# Patient Record
Sex: FEMALE
[De-identification: names, ages, dates, MRNs, and addresses within clinical notes are randomized per-mention and may not be internally consistent; named-entity substitution may affect disease eponyms.]

---

## 2017-04-10 ENCOUNTER — MEDICATION RENEWAL (OUTPATIENT)
Age: 37
End: 2017-04-10

## 2017-06-09 ENCOUNTER — MEDICATION RENEWAL (OUTPATIENT)
Age: 37
End: 2017-06-09

## 2017-06-21 ENCOUNTER — APPOINTMENT (OUTPATIENT)
Dept: ENDOCRINOLOGY | Facility: CLINIC | Age: 37
End: 2017-06-21

## 2017-06-21 VITALS
HEART RATE: 65 BPM | DIASTOLIC BLOOD PRESSURE: 77 MMHG | SYSTOLIC BLOOD PRESSURE: 112 MMHG | BODY MASS INDEX: 21.74 KG/M2 | HEIGHT: 64.3 IN | WEIGHT: 127.31 LBS

## 2017-06-22 LAB — TSH SERPL-ACNC: 9.31 UIU/ML

## 2017-12-15 ENCOUNTER — RX RENEWAL (OUTPATIENT)
Age: 37
End: 2017-12-15

## 2018-05-15 ENCOUNTER — RX RENEWAL (OUTPATIENT)
Age: 38
End: 2018-05-15

## 2018-07-31 ENCOUNTER — MEDICATION RENEWAL (OUTPATIENT)
Age: 38
End: 2018-07-31

## 2018-08-08 ENCOUNTER — APPOINTMENT (OUTPATIENT)
Dept: ENDOCRINOLOGY | Facility: CLINIC | Age: 38
End: 2018-08-08
Payer: COMMERCIAL

## 2018-08-08 VITALS
BODY MASS INDEX: 22.36 KG/M2 | SYSTOLIC BLOOD PRESSURE: 98 MMHG | HEART RATE: 70 BPM | DIASTOLIC BLOOD PRESSURE: 65 MMHG | WEIGHT: 131 LBS | HEIGHT: 64.29 IN

## 2018-08-08 PROCEDURE — 99213 OFFICE O/P EST LOW 20 MIN: CPT

## 2018-10-19 ENCOUNTER — MEDICATION RENEWAL (OUTPATIENT)
Age: 38
End: 2018-10-19

## 2019-03-19 ENCOUNTER — MEDICATION RENEWAL (OUTPATIENT)
Age: 39
End: 2019-03-19

## 2019-08-26 ENCOUNTER — MEDICATION RENEWAL (OUTPATIENT)
Age: 39
End: 2019-08-26

## 2019-09-26 ENCOUNTER — APPOINTMENT (OUTPATIENT)
Dept: ENDOCRINOLOGY | Facility: CLINIC | Age: 39
End: 2019-09-26
Payer: COMMERCIAL

## 2019-09-26 VITALS
HEART RATE: 71 BPM | SYSTOLIC BLOOD PRESSURE: 112 MMHG | WEIGHT: 134 LBS | DIASTOLIC BLOOD PRESSURE: 71 MMHG | BODY MASS INDEX: 22.79 KG/M2

## 2019-09-26 DIAGNOSIS — Z23 ENCOUNTER FOR IMMUNIZATION: ICD-10-CM

## 2019-09-26 PROCEDURE — 99213 OFFICE O/P EST LOW 20 MIN: CPT | Mod: 25

## 2019-09-26 PROCEDURE — 90686 IIV4 VACC NO PRSV 0.5 ML IM: CPT

## 2019-09-26 PROCEDURE — G0008: CPT

## 2019-09-26 NOTE — HISTORY OF PRESENT ILLNESS
[FreeTextEntry1] : No health issues since last visit.\par feeling great.\par no palpitations or feeling jittery \par sleeping well.  BMs regular, no constipation\par working out on [employer] bike 5x/week before kids get up\par periods regular, every 24 days\par \par Taking Tirosint 112mcg/day.  extra pill 2x/month (has reminder on calendar)

## 2019-09-26 NOTE — PHYSICAL EXAM
[Healthy Appearance] : healthy appearance [Alert] : alert [Normal Voice/Communication] : normal voice communication [No Proptosis] : no proptosis [Normal Hearing] : hearing was normal [No Lid Lag] : no lid lag [Thyroid Not Enlarged] : the thyroid was not enlarged [No Thyroid Nodules] : there were no palpable thyroid nodules [Clear to Auscultation] : lungs were clear to auscultation bilaterally [Normal S1, S2] : normal S1 and S2 [Regular Rhythm] : with a regular rhythm [No Stigmata of Cushings Syndrome] : no stigmata of cushings syndrome [Acanthosis Nigricans] : no acanthosis nigricans [Normal Insight/Judgement] : insight and judgment were intact [Normal Affect] : the affect was normal [Normal Mood] : the mood was normal

## 2019-09-26 NOTE — ASSESSMENT
[FreeTextEntry1] : Goal TSH 0.5-4.0 range. Continue Tirosint. Previously had fluctuating levels of TSH with Synthroid and levothyroxine.\par flu vaccine given, R deltoid\par RTO 1 year

## 2019-09-30 LAB — TSH SERPL-ACNC: 1.5 UIU/ML

## 2019-11-15 ENCOUNTER — MEDICATION RENEWAL (OUTPATIENT)
Age: 39
End: 2019-11-15

## 2020-04-14 ENCOUNTER — TRANSCRIPTION ENCOUNTER (OUTPATIENT)
Age: 40
End: 2020-04-14

## 2020-05-20 ENCOUNTER — RX RENEWAL (OUTPATIENT)
Age: 40
End: 2020-05-20

## 2020-10-05 ENCOUNTER — RX RENEWAL (OUTPATIENT)
Age: 40
End: 2020-10-05

## 2020-11-06 ENCOUNTER — APPOINTMENT (OUTPATIENT)
Dept: ENDOCRINOLOGY | Facility: CLINIC | Age: 40
End: 2020-11-06
Payer: COMMERCIAL

## 2020-11-06 VITALS
WEIGHT: 138 LBS | SYSTOLIC BLOOD PRESSURE: 131 MMHG | HEART RATE: 61 BPM | BODY MASS INDEX: 23.47 KG/M2 | DIASTOLIC BLOOD PRESSURE: 86 MMHG

## 2020-11-06 DIAGNOSIS — Z00.00 ENCOUNTER FOR GENERAL ADULT MEDICAL EXAMINATION W/OUT ABNORMAL FINDINGS: ICD-10-CM

## 2020-11-06 PROCEDURE — 99072 ADDL SUPL MATRL&STAF TM PHE: CPT

## 2020-11-06 PROCEDURE — 99213 OFFICE O/P EST LOW 20 MIN: CPT | Mod: 25

## 2020-11-06 NOTE — HISTORY OF PRESENT ILLNESS
[FreeTextEntry1] : No health issues since last visit.\par ? 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  extra pill 2x/month (has reminder on calendar)

## 2020-11-06 NOTE — PHYSICAL EXAM
[Alert] : alert [Healthy Appearance] : healthy appearance [No Proptosis] : no proptosis [No Lid Lag] : no lid lag [Normal Hearing] : hearing was normal [No LAD] : no lymphadenopathy [Thyroid Not Enlarged] : the thyroid was not enlarged [Clear to Auscultation] : lungs were clear to auscultation bilaterally [Normal S1, S2] : normal S1 and S2 [Regular Rhythm] : with a regular rhythm [Acanthosis Nigricans] : no acanthosis nigricans [Normal Affect] : the affect was normal [Normal Mood] : the mood was normal

## 2020-11-06 NOTE — ASSESSMENT
[FreeTextEntry1] : Hashimoto's\par Goal TSH 0.5-4.0 range. Continue Tirosint--previously had fluctuating levels of TSH with Synthroid and levothyroxine.\par \par RTO 1 year

## 2020-11-09 LAB
ALBUMIN SERPL ELPH-MCNC: 4.6 G/DL
ALP BLD-CCNC: 63 U/L
ALT SERPL-CCNC: 8 U/L
ANION GAP SERPL CALC-SCNC: 13 MMOL/L
AST SERPL-CCNC: 21 U/L
BILIRUB SERPL-MCNC: 0.2 MG/DL
BUN SERPL-MCNC: 12 MG/DL
CALCIUM SERPL-MCNC: 9.7 MG/DL
CHLORIDE SERPL-SCNC: 99 MMOL/L
CHOLEST SERPL-MCNC: 226 MG/DL
CO2 SERPL-SCNC: 26 MMOL/L
CREAT SERPL-MCNC: 0.82 MG/DL
GLUCOSE SERPL-MCNC: 83 MG/DL
HDLC SERPL-MCNC: 74 MG/DL
LDLC SERPL CALC-MCNC: 128 MG/DL
NONHDLC SERPL-MCNC: 152 MG/DL
POTASSIUM SERPL-SCNC: 4.3 MMOL/L
PROT SERPL-MCNC: 7.1 G/DL
SODIUM SERPL-SCNC: 138 MMOL/L
TRIGL SERPL-MCNC: 118 MG/DL
TSH SERPL-ACNC: 2.07 UIU/ML

## 2020-11-30 ENCOUNTER — RX RENEWAL (OUTPATIENT)
Age: 40
End: 2020-11-30

## 2021-06-28 ENCOUNTER — RX RENEWAL (OUTPATIENT)
Age: 41
End: 2021-06-28

## 2021-08-23 ENCOUNTER — RX RENEWAL (OUTPATIENT)
Age: 41
End: 2021-08-23

## 2021-10-20 ENCOUNTER — RX RENEWAL (OUTPATIENT)
Age: 41
End: 2021-10-20

## 2021-11-08 ENCOUNTER — APPOINTMENT (OUTPATIENT)
Dept: ENDOCRINOLOGY | Facility: CLINIC | Age: 41
End: 2021-11-08
Payer: COMMERCIAL

## 2021-11-08 VITALS
BODY MASS INDEX: 22.79 KG/M2 | SYSTOLIC BLOOD PRESSURE: 143 MMHG | DIASTOLIC BLOOD PRESSURE: 104 MMHG | HEART RATE: 69 BPM | WEIGHT: 134 LBS

## 2021-11-08 PROCEDURE — 99213 OFFICE O/P EST LOW 20 MIN: CPT

## 2021-11-08 NOTE — ASSESSMENT
[FreeTextEntry1] : Hashimoto's\par Goal TSH 0.5-4.0 range. Continue Tirosint--previously had fluctuating levels of TSH with Synthroid and levothyroxine.\par \par HIgh BP. \par Advised to reduce salt in diet, including breads, soups, frozen and canned foods, deli meats. \par continue regular exercise.  Try to get more sleep (goal 7h/night).\par Recommended getting BP monitor to check at home ([employer]ron brand recommended).  If BP improves, then I would be ok with starting her on low estrogen content birth control pill to reduce menstrual bleeding and for birth control. \par RTO 1 year

## 2021-11-08 NOTE — HISTORY OF PRESENT ILLNESS
[FreeTextEntry1] : hasn't been feeling good the past 3-4 weeks.  Work has been very busy, stressful, not getting enough sleep (< 6h/night and choppy).  Feels like she has the flu when she gets up (achy, tired).  no nausea though appetite is down.\par weight is 4 lb less than last year, but same as the year prior\par no palpitations or feeling jittery \par still exercising a lot, works with weights 3x/week and uses [employer] bike\par BP is high today and was high in August at her PCP visit.\par period regular, every 24 days.   very heavy.   not planning pregnancy.  Interested in birth control. \par got flu vaccine\par \par Taking Tirosint 112mcg/day.  extra pill 2x/month (has reminder on calendar).   PA recently obtained for next year

## 2021-11-08 NOTE — DATA REVIEWED
[FreeTextEntry1] : 11/20: TSH 2.07, tot chol 226, trig 118, HDL 74, \par 7/18: TSH 2.640\par 6/17: TSH 9.31\par 8/16: TSH 3.43

## 2021-11-08 NOTE — PHYSICAL EXAM
[Alert] : alert [Healthy Appearance] : healthy appearance [No Proptosis] : no proptosis [No Lid Lag] : no lid lag [Normal Hearing] : hearing was normal [No LAD] : no lymphadenopathy [Thyroid Not Enlarged] : the thyroid was not enlarged [Clear to Auscultation] : lungs were clear to auscultation bilaterally [Normal S1, S2] : normal S1 and S2 [Regular Rhythm] : with a regular rhythm [Normal Affect] : the affect was normal [Normal Mood] : the mood was normal [Acanthosis Nigricans] : no acanthosis nigricans

## 2021-11-09 LAB
ALBUMIN SERPL ELPH-MCNC: 4.6 G/DL
ALP BLD-CCNC: 63 U/L
ALT SERPL-CCNC: 9 U/L
ANION GAP SERPL CALC-SCNC: 14 MMOL/L
AST SERPL-CCNC: 16 U/L
BASOPHILS # BLD AUTO: 0.04 K/UL
BASOPHILS NFR BLD AUTO: 0.7 %
BILIRUB SERPL-MCNC: 0.3 MG/DL
BUN SERPL-MCNC: 9 MG/DL
CALCIUM SERPL-MCNC: 9.6 MG/DL
CHLORIDE SERPL-SCNC: 101 MMOL/L
CHOLEST SERPL-MCNC: 226 MG/DL
CO2 SERPL-SCNC: 25 MMOL/L
CREAT SERPL-MCNC: 0.8 MG/DL
EOSINOPHIL # BLD AUTO: 0.05 K/UL
EOSINOPHIL NFR BLD AUTO: 0.9 %
FERRITIN SERPL-MCNC: 60 NG/ML
GLUCOSE SERPL-MCNC: 94 MG/DL
HCT VFR BLD CALC: 44.2 %
HDLC SERPL-MCNC: 72 MG/DL
HGB BLD-MCNC: 14.5 G/DL
IMM GRANULOCYTES NFR BLD AUTO: 0.2 %
LDLC SERPL CALC-MCNC: 138 MG/DL
LYMPHOCYTES # BLD AUTO: 1.5 K/UL
LYMPHOCYTES NFR BLD AUTO: 26 %
MAN DIFF?: NORMAL
MCHC RBC-ENTMCNC: 30.4 PG
MCHC RBC-ENTMCNC: 32.8 GM/DL
MCV RBC AUTO: 92.7 FL
MONOCYTES # BLD AUTO: 0.46 K/UL
MONOCYTES NFR BLD AUTO: 8 %
NEUTROPHILS # BLD AUTO: 3.7 K/UL
NEUTROPHILS NFR BLD AUTO: 64.2 %
NONHDLC SERPL-MCNC: 154 MG/DL
PLATELET # BLD AUTO: 210 K/UL
POTASSIUM SERPL-SCNC: 4.3 MMOL/L
PROT SERPL-MCNC: 7 G/DL
RBC # BLD: 4.77 M/UL
RBC # FLD: 12 %
SODIUM SERPL-SCNC: 139 MMOL/L
TRIGL SERPL-MCNC: 82 MG/DL
TSH SERPL-ACNC: 2.34 UIU/ML
WBC # FLD AUTO: 5.76 K/UL

## 2022-06-01 ENCOUNTER — RX RENEWAL (OUTPATIENT)
Age: 42
End: 2022-06-01

## 2022-11-14 ENCOUNTER — RX RENEWAL (OUTPATIENT)
Age: 42
End: 2022-11-14

## 2023-02-10 ENCOUNTER — NON-APPOINTMENT (OUTPATIENT)
Age: 43
End: 2023-02-10

## 2023-02-15 ENCOUNTER — APPOINTMENT (OUTPATIENT)
Dept: ENDOCRINOLOGY | Facility: CLINIC | Age: 43
End: 2023-02-15
Payer: COMMERCIAL

## 2023-02-15 VITALS
DIASTOLIC BLOOD PRESSURE: 82 MMHG | WEIGHT: 133 LBS | BODY MASS INDEX: 22.62 KG/M2 | HEART RATE: 101 BPM | SYSTOLIC BLOOD PRESSURE: 130 MMHG

## 2023-02-15 DIAGNOSIS — E89.0 POSTPROCEDURAL HYPOTHYROIDISM: ICD-10-CM

## 2023-02-15 DIAGNOSIS — E06.3 AUTOIMMUNE THYROIDITIS: ICD-10-CM

## 2023-02-15 PROCEDURE — 99213 OFFICE O/P EST LOW 20 MIN: CPT

## 2023-02-15 NOTE — HISTORY OF PRESENT ILLNESS
[FreeTextEntry1] : Currently is feeling good.\par She had Covid infection last August, and following that had nausea due to presumed gastritis.  SHe took Prilosec for 2 weeks with improvement and now takes it prn.  But she changed her diet (reduced fried foods, tomato, citrus, alcohol and limits coffee to one per day).\par no palpitations or feeling jittery \par \par Taking Tirosint 112mcg/day.  extra pill 2x/month (has reminder on calendar).

## 2023-02-15 NOTE — ASSESSMENT
[FreeTextEntry1] : Hypothyroid due to [place].\par Goal TSH 0.5-4.0 range. Continue Tirosint--previously had fluctuating levels of TSH with Synthroid and levothyroxine.\par \par RTO 1 year

## 2023-02-15 NOTE — DATA REVIEWED
[FreeTextEntry1] : 11/21 TSH 2.34, tot chol 226, trig 82, HDL 72, \par 11/20: TSH 2.07, tot chol 226, trig 118, HDL 74, \par 7/18: TSH 2.640\par 6/17: TSH 9.31\par 8/16: TSH 3.43

## 2023-02-15 NOTE — PHYSICAL EXAM
[Alert] : alert [No Acute Distress] : no acute distress [No Proptosis] : no proptosis [No Lid Lag] : no lid lag [Normal Hearing] : hearing was normal [No LAD] : no lymphadenopathy [Clear to Auscultation] : lungs were clear to auscultation bilaterally [Normal S1, S2] : normal S1 and S2 [Regular Rhythm] : with a regular rhythm [Normal Affect] : the affect was normal [Normal Mood] : the mood was normal [Acanthosis Nigricans] : no acanthosis nigricans [de-identified] : thyroid not palpable

## 2023-02-16 LAB — TSH SERPL-ACNC: 0.4 UIU/ML

## 2023-09-28 ENCOUNTER — RX RENEWAL (OUTPATIENT)
Age: 43
End: 2023-09-28

## 2024-02-26 ENCOUNTER — RX RENEWAL (OUTPATIENT)
Age: 44
End: 2024-02-26

## 2024-02-29 ENCOUNTER — APPOINTMENT (OUTPATIENT)
Dept: ENDOCRINOLOGY | Facility: CLINIC | Age: 44
End: 2024-02-29
Payer: COMMERCIAL

## 2024-02-29 VITALS
WEIGHT: 129 LBS | DIASTOLIC BLOOD PRESSURE: 105 MMHG | SYSTOLIC BLOOD PRESSURE: 153 MMHG | BODY MASS INDEX: 22.02 KG/M2 | HEIGHT: 64.29 IN | HEART RATE: 90 BPM

## 2024-02-29 PROCEDURE — 99213 OFFICE O/P EST LOW 20 MIN: CPT

## 2024-02-29 NOTE — PHYSICAL EXAM
[Alert] : alert [No Acute Distress] : no acute distress [No Proptosis] : no proptosis [No Lid Lag] : no lid lag [Normal Hearing] : hearing was normal [No LAD] : no lymphadenopathy [Clear to Auscultation] : lungs were clear to auscultation bilaterally [Normal S1, S2] : normal S1 and S2 [Regular Rhythm] : with a regular rhythm [Acanthosis Nigricans] : no acanthosis nigricans [Normal Affect] : the affect was normal [Normal Mood] : the mood was normal [de-identified] : thyroid not palpable

## 2024-02-29 NOTE — HISTORY OF PRESENT ILLNESS
[FreeTextEntry1] : She has been having more anxiety the past few months and she was hoping it was due to her thyroid. She fell in October and injured her knee in January, so was not able to exercise as much as she usually does. She had some panic attacks which is not like her and outsized reactions. Periods still regular, every 3 weeks, which is her usual frequency (every 25-26 days). no night sweats or hot flashes. labs reviewed from earlier this week:  TSH 2.16, 25D 22.5.   She started taking vitamin D 2000 Iu/day.  Taking Tirosint 112mcg/day.  extra pill 2x/month (has reminder on calendar).

## 2024-02-29 NOTE — DATA REVIEWED
[FreeTextEntry1] : 2/24  TSH 2.17, 25D 22.8 2/23  TSH 0.40 11/21  TSH 2.34 11/20: TSH 2.07, tot chol 226, trig 118, HDL 74,  7/18: TSH 2.640 6/17: TSH 9.31 8/16: TSH 3.43

## 2024-02-29 NOTE — ASSESSMENT
[FreeTextEntry1] : Hypothyroid due to [place].   normal TSH, so anxiety is not due to thyroid. Continue Tirosint  112mcg--previously had fluctuating levels of TSH with Synthroid and levothyroxine.  RTO 1 year

## 2024-04-22 ENCOUNTER — APPOINTMENT (OUTPATIENT)
Dept: GASTROENTEROLOGY | Facility: CLINIC | Age: 44
End: 2024-04-22
Payer: COMMERCIAL

## 2024-04-22 VITALS
DIASTOLIC BLOOD PRESSURE: 78 MMHG | HEART RATE: 87 BPM | RESPIRATION RATE: 16 BRPM | WEIGHT: 130 LBS | BODY MASS INDEX: 22.2 KG/M2 | TEMPERATURE: 97.3 F | OXYGEN SATURATION: 98 % | HEIGHT: 64 IN | SYSTOLIC BLOOD PRESSURE: 110 MMHG

## 2024-04-22 DIAGNOSIS — K21.9 GASTRO-ESOPHAGEAL REFLUX DISEASE W/OUT ESOPHAGITIS: ICD-10-CM

## 2024-04-22 PROCEDURE — 99204 OFFICE O/P NEW MOD 45 MIN: CPT

## 2024-04-22 NOTE — ASSESSMENT
[FreeTextEntry1] : Impression: #Acid reflux - mild chronic sx, no alarm signs.   Plan: - PPI trial x 2 months. Discussed correct administration. - F/u 3 months. If no improvement or return of sx, can discuss EGD vs EGD/BRAVO vs chronic PPI therapy.

## 2024-04-22 NOTE — HISTORY OF PRESENT ILLNESS
[FreeTextEntry1] : BERE TAYLOR is a 43 year F here for reflux. She has a history of hypothyroidism.  Current symptoms: Long-standing dry cough, on and off for years. Ever since COVID in 2021, gets random bouts of nausea. Improves with PPI or H2RA. Also has intermittent heartburn. Nausea bouts tend to be in the morning when she wakes up, before breakfast. Can last for several days - leads to decreased appetite, but when she forces herself to eat she feels a little better. Happens about 5-7 days in a month. No vomiting.   Dry cough is pretty chronic, sometimes gets worse after eating.   Heartburn is rare, intermittent, random. Triggered by heavy, fried foods.   Is losing some weight, likely due to elevated thyroid function, dose of Synthroid is being adjustment.   GI ROS negative for unintentional weight loss, fevers/chills, dysphagia, reflux, abdominal pain, bloating, nausea, vomiting, early satiety, diarrhea, constipation, melena, hematochezia. Patient denies NSAID use.  Current Meds: Tirosint All: NKDA FHx: Both parents have reflux, no BE SHx: Never smoker, minimal EtOH, works as   Relevant Exam: Well appearing  Labs: CBC reviewed, no anemia or microcytosis CMP reviewed, normal liver enzymes Iron studies wnl A1c reviewed TSH reviewed

## 2024-05-28 ENCOUNTER — RX RENEWAL (OUTPATIENT)
Age: 44
End: 2024-05-28

## 2024-05-28 RX ORDER — OMEPRAZOLE 40 MG/1
40 CAPSULE, DELAYED RELEASE ORAL
Qty: 30 | Refills: 1 | Status: ACTIVE | COMMUNITY
Start: 2024-04-22 | End: 1900-01-01

## 2024-09-23 ENCOUNTER — RX RENEWAL (OUTPATIENT)
Age: 44
End: 2024-09-23

## 2024-12-30 ENCOUNTER — NON-APPOINTMENT (OUTPATIENT)
Age: 44
End: 2024-12-30

## 2024-12-30 ENCOUNTER — APPOINTMENT (OUTPATIENT)
Dept: GASTROENTEROLOGY | Facility: CLINIC | Age: 44
End: 2024-12-30
Payer: COMMERCIAL

## 2024-12-30 VITALS
SYSTOLIC BLOOD PRESSURE: 127 MMHG | HEART RATE: 71 BPM | TEMPERATURE: 97.4 F | BODY MASS INDEX: 22.02 KG/M2 | HEIGHT: 64 IN | OXYGEN SATURATION: 98 % | DIASTOLIC BLOOD PRESSURE: 84 MMHG | WEIGHT: 129 LBS

## 2024-12-30 DIAGNOSIS — K21.9 GASTRO-ESOPHAGEAL REFLUX DISEASE W/OUT ESOPHAGITIS: ICD-10-CM

## 2024-12-30 PROCEDURE — 99214 OFFICE O/P EST MOD 30 MIN: CPT

## 2024-12-30 PROCEDURE — G2211 COMPLEX E/M VISIT ADD ON: CPT | Mod: NC

## 2024-12-30 RX ORDER — OMEPRAZOLE 20 MG/1
20 CAPSULE, DELAYED RELEASE ORAL DAILY
Qty: 30 | Refills: 2 | Status: ACTIVE | COMMUNITY
Start: 2024-12-30 | End: 1900-01-01

## 2025-02-13 ENCOUNTER — RX RENEWAL (OUTPATIENT)
Age: 45
End: 2025-02-13

## 2025-02-25 ENCOUNTER — RX RENEWAL (OUTPATIENT)
Age: 45
End: 2025-02-25

## 2025-06-09 ENCOUNTER — APPOINTMENT (OUTPATIENT)
Dept: ENDOCRINOLOGY | Facility: CLINIC | Age: 45
End: 2025-06-09
Payer: COMMERCIAL

## 2025-06-09 VITALS
SYSTOLIC BLOOD PRESSURE: 112 MMHG | HEIGHT: 64 IN | WEIGHT: 128 LBS | HEART RATE: 87 BPM | DIASTOLIC BLOOD PRESSURE: 79 MMHG | BODY MASS INDEX: 21.85 KG/M2

## 2025-06-09 PROCEDURE — 99213 OFFICE O/P EST LOW 20 MIN: CPT

## 2025-07-28 ENCOUNTER — RX RENEWAL (OUTPATIENT)
Age: 45
End: 2025-07-28